# Patient Record
Sex: FEMALE | Race: WHITE | NOT HISPANIC OR LATINO
[De-identification: names, ages, dates, MRNs, and addresses within clinical notes are randomized per-mention and may not be internally consistent; named-entity substitution may affect disease eponyms.]

---

## 2020-07-15 PROBLEM — Z00.00 ENCOUNTER FOR PREVENTIVE HEALTH EXAMINATION: Status: ACTIVE | Noted: 2020-07-15

## 2020-07-28 ENCOUNTER — APPOINTMENT (OUTPATIENT)
Dept: GASTROENTEROLOGY | Facility: CLINIC | Age: 55
End: 2020-07-28
Payer: COMMERCIAL

## 2020-07-28 DIAGNOSIS — R19.7 DIARRHEA, UNSPECIFIED: ICD-10-CM

## 2020-07-28 PROCEDURE — 99442: CPT

## 2020-07-28 NOTE — ASSESSMENT
[FreeTextEntry1] : PATIENT HAS A HX OF BREAST CA..\par SHE IS UNDER THE CARE OF DR JACOBS\par \par HER MEDICAL HEALTH IS VERY GOOD\par \par LOOSE BM GENERALLY IN THE AM\par WITH URGENCY\par SO SHE IS INTERESTED IN TRYING BILE SALT BINDING TREATMENT\par \par AND WE WILL SET UP COLONOSCOPY\par REVIEW OF THE RISKS\par \par More than 50% of the face to face time was devoted to counseling and /or coordination of care.  THis coordination of care may have included reviewing other medical notes and reports, and communicating with other health professionals\par

## 2020-07-28 NOTE — HISTORY OF PRESENT ILLNESS
[FreeTextEntry1] : TELEPHONIC VISIT;  \par CONSENT OBTAINED\par SAW DR JACOBS\par AND HE SUGGESTED A COLONOSCOPY AS A FIVE YEAR FU\par \par AND SINCE IT IS SIX YEARS SHE \par \par DR FIOR SLOAN IS HER PCP\par \par AND FOR THIS REASON SHE HAS CONTACTED US\par \par NO GI SYMPTOMS\par \par HAS A HX OF CHOLECYSTECTOMY\par \par PATIENT HAS CHOLEREIC DIARRHEA..\par \par I HAD PRESCRIBED QUESTRAN, BUT SHE HAS AVOIDED TAKING IT..\par \par SHE WAS CONCERNED ABOUT GAINING WEIGHT\par \par HAS A BM ONCE OR SO PER DAY, BUT CAN BE WITH GREAT URGENCY\par \par IF SHE HAS A BM IN AM, SHE MAY BE OK FOR THE REMAINDER OF THE DAY\par \par MEDS; NONE\par \par NO CARDIOVASCULAR DISEASE, NO ANGINA\par NO SMOKING HX, NO ASTHM\par NO DIABETES

## 2020-09-01 ENCOUNTER — RESULT REVIEW (OUTPATIENT)
Age: 55
End: 2020-09-01

## 2020-09-03 ENCOUNTER — RESULT REVIEW (OUTPATIENT)
Age: 55
End: 2020-09-03

## 2020-09-04 ENCOUNTER — APPOINTMENT (OUTPATIENT)
Dept: GASTROENTEROLOGY | Facility: HOSPITAL | Age: 55
End: 2020-09-04

## 2020-12-01 ENCOUNTER — APPOINTMENT (OUTPATIENT)
Dept: GASTROENTEROLOGY | Facility: CLINIC | Age: 55
End: 2020-12-01
Payer: COMMERCIAL

## 2020-12-01 PROCEDURE — 99442: CPT

## 2020-12-01 NOTE — HISTORY OF PRESENT ILLNESS
[FreeTextEntry1] : 1.  we are doing a Telephonic visit, consent on file\par \par 2.  results of prior colonoscopy reviewed, first in six years, performed on sept 4th, 2020\par \par two sub cm, polyps were removed, both were tubular adenomas\par \par and in addition\par random colon biopsies because of diarrhea, and these were nl, no pathologc change\par \par 3.  patient had complained of long standing diarrhea, and i had been suspicious of post cholecystectomy diarrhea\par and for that reason, a trial of cholestyramine was instituted...\par \par actually, tablets of colestipol prescribed..\par one g bid\par \par immediate response and improvement;  \par \par most of the time she is fine, formed stool, first formed stools in years on the colestipid

## 2020-12-01 NOTE — ASSESSMENT
[FreeTextEntry1] : doing well...path reviewed\par \par doing well on colestipol one g po bid\par \par next visit suggested, telephonic in one year to check on hos she is doing with bile salt binding agent\par \par More than 50% of the face to face time was devoted to counseling and /or coordination of care.  THis coordination of care may have included reviewing other medical notes and reports, and communicating with other health professionals\par \par

## 2021-03-06 ENCOUNTER — RX RENEWAL (OUTPATIENT)
Age: 56
End: 2021-03-06

## 2021-09-24 ENCOUNTER — RESULT REVIEW (OUTPATIENT)
Age: 56
End: 2021-09-24

## 2021-09-26 ENCOUNTER — RX RENEWAL (OUTPATIENT)
Age: 56
End: 2021-09-26

## 2022-01-11 ENCOUNTER — APPOINTMENT (OUTPATIENT)
Dept: BREAST CENTER | Facility: CLINIC | Age: 57
End: 2022-01-11
Payer: COMMERCIAL

## 2022-01-11 ENCOUNTER — NON-APPOINTMENT (OUTPATIENT)
Age: 57
End: 2022-01-11

## 2022-01-11 VITALS
SYSTOLIC BLOOD PRESSURE: 168 MMHG | HEIGHT: 63 IN | DIASTOLIC BLOOD PRESSURE: 91 MMHG | HEART RATE: 85 BPM | BODY MASS INDEX: 28.35 KG/M2 | WEIGHT: 160 LBS

## 2022-01-11 DIAGNOSIS — Z82.5 FAMILY HISTORY OF ASTHMA AND OTHER CHRONIC LOWER RESPIRATORY DISEASES: ICD-10-CM

## 2022-01-11 DIAGNOSIS — Z92.21 PERSONAL HISTORY OF ANTINEOPLASTIC CHEMOTHERAPY: ICD-10-CM

## 2022-01-11 DIAGNOSIS — Z86.010 PERSONAL HISTORY OF COLONIC POLYPS: ICD-10-CM

## 2022-01-11 DIAGNOSIS — Z98.82 BREAST IMPLANT STATUS: ICD-10-CM

## 2022-01-11 DIAGNOSIS — Z80.7 FAMILY HISTORY OF OTHER MALIGNANT NEOPLASMS OF LYMPHOID, HEMATOPOIETIC AND RELATED TISSUES: ICD-10-CM

## 2022-01-11 DIAGNOSIS — Z23 ENCOUNTER FOR IMMUNIZATION: ICD-10-CM

## 2022-01-11 DIAGNOSIS — Z80.3 FAMILY HISTORY OF MALIGNANT NEOPLASM OF BREAST: ICD-10-CM

## 2022-01-11 DIAGNOSIS — N60.19 DIFFUSE CYSTIC MASTOPATHY OF UNSPECIFIED BREAST: ICD-10-CM

## 2022-01-11 DIAGNOSIS — Z12.31 ENCOUNTER FOR SCREENING MAMMOGRAM FOR MALIGNANT NEOPLASM OF BREAST: ICD-10-CM

## 2022-01-11 DIAGNOSIS — Z84.81 FAMILY HISTORY OF CARRIER OF GENETIC DISEASE: ICD-10-CM

## 2022-01-11 PROCEDURE — 99205 OFFICE O/P NEW HI 60 MIN: CPT

## 2022-01-11 NOTE — PHYSICAL EXAM
[Normocephalic] : normocephalic [Atraumatic] : atraumatic [Supple] : supple [No Supraclavicular Adenopathy] : no supraclavicular adenopathy [No Cervical Adenopathy] : no cervical adenopathy [No Thyromegaly] : no thyromegaly [Normal Sinus Rhythm] : normal sinus rhythm [Examined in the supine and seated position] : examined in the supine and seated position [No dominant masses] : no dominant masses in right breast  [No dominant masses] : no dominant masses left breast [No Nipple Retraction] : no left nipple retraction [No Nipple Discharge] : no left nipple discharge [No Axillary Lymphadenopathy] : no left axillary lymphadenopathy [No Edema] : no edema [No Rashes] : no rashes [No Ulceration] : no ulceration [de-identified] : S/P MRM/TRAM/Implt. NER. %. No lymphedema.

## 2022-01-11 NOTE — HISTORY OF PRESENT ILLNESS
[FreeTextEntry1] : Pt S/P R MRM/TRAM/Implt/ACT/tmx/AI for R Stage IIA ILCA/LCIS, ER+, ME+, Her2 1+\par S/P R Br Bx (9/19/03)([facility], [facility]): +2.5cm ILCA/LCIS (classic), ER+, ME+, Her2 1+\par S/P R MRM/TRAM ([facility])(9/29/03): +resid ILCA/LCIS, -0/23 LN, -margins\par R Stage IIA ILCA/LCIS\par S/P R Implt ([facility], [facility])\par S/P AC/T (?)([facility])\par Started tmx x 6mos > sw'ed to AI x 7 yrs\par S/P L Br Bx (2/4/06)([facility]): Benign\par BRCA ([facility])(11/29/21): BRCA-, +RAD50 Hetrozygous\par Colonoscopy (9/21): +polyps > 3yr F/U\par +FH Br Ca (P. FC 35 (BRCA+))\par Got Pfizer booster (11/21)([facility])\par No other Breast Surgery, Breast Procedures or Nipple Discharge. \par No FH Ovarian, Pancreatic Cancer or Melanoma. \par \par \par

## 2022-01-23 DIAGNOSIS — Z85.3 PERSONAL HISTORY OF MALIGNANT NEOPLASM OF BREAST: ICD-10-CM

## 2022-01-23 DIAGNOSIS — Z78.9 OTHER SPECIFIED HEALTH STATUS: ICD-10-CM

## 2022-01-24 DIAGNOSIS — F41.9 ANXIETY DISORDER, UNSPECIFIED: ICD-10-CM

## 2022-01-26 ENCOUNTER — TRANSCRIPTION ENCOUNTER (OUTPATIENT)
Age: 57
End: 2022-01-26

## 2022-01-26 ENCOUNTER — NON-APPOINTMENT (OUTPATIENT)
Age: 57
End: 2022-01-26

## 2022-01-26 ENCOUNTER — APPOINTMENT (OUTPATIENT)
Dept: CARDIOLOGY | Facility: CLINIC | Age: 57
End: 2022-01-26
Payer: COMMERCIAL

## 2022-01-26 VITALS
OXYGEN SATURATION: 97 % | DIASTOLIC BLOOD PRESSURE: 90 MMHG | SYSTOLIC BLOOD PRESSURE: 136 MMHG | BODY MASS INDEX: 28.7 KG/M2 | HEART RATE: 90 BPM | WEIGHT: 162 LBS

## 2022-01-26 DIAGNOSIS — R06.00 DYSPNEA, UNSPECIFIED: ICD-10-CM

## 2022-01-26 DIAGNOSIS — Z86.79 PERSONAL HISTORY OF OTHER DISEASES OF THE CIRCULATORY SYSTEM: ICD-10-CM

## 2022-01-26 DIAGNOSIS — Z86.39 PERSONAL HISTORY OF OTHER ENDOCRINE, NUTRITIONAL AND METABOLIC DISEASE: ICD-10-CM

## 2022-01-26 PROCEDURE — 93000 ELECTROCARDIOGRAM COMPLETE: CPT

## 2022-01-26 PROCEDURE — 99204 OFFICE O/P NEW MOD 45 MIN: CPT

## 2022-01-26 PROCEDURE — 36415 COLL VENOUS BLD VENIPUNCTURE: CPT

## 2022-01-28 ENCOUNTER — NON-APPOINTMENT (OUTPATIENT)
Age: 57
End: 2022-01-28

## 2022-01-28 PROBLEM — Z86.39 HISTORY OF VITAMIN D DEFICIENCY: Status: RESOLVED | Noted: 2022-01-28 | Resolved: 2022-01-28

## 2022-01-28 PROBLEM — Z86.79 HISTORY OF HYPERTENSION: Status: RESOLVED | Noted: 2022-01-26 | Resolved: 2022-01-28

## 2022-01-28 NOTE — HISTORY OF PRESENT ILLNESS
[FreeTextEntry1] : 56-year-old female\par Cardiology consultation requested because of hypertension\par \par Emy has no known heart disease. There is no history of a myocardial infarction angina congestive heart failure or cerebrovascular accident. Most recently she has been found to have elevated blood pressure levels typically 155- 150 / 90 -100 mmHg. There is no prior history of hypertension. Her brother has hypertension requiring medical therapy.\par \par Emy experiences dyspnea on exertion. However the symptoms are not progressive or severe. No chest pain. No syncope.\par \par There is no history of diabetes smoking or hyperlipidemia.  There is no family history of an acute myocardial infarction or sudden death\par \par \par Mrs. Garibay presents today for cardiovascular evaluation.

## 2022-01-28 NOTE — PHYSICAL EXAM
[Normal Conjunctiva] : normal conjunctiva [Normal S1, S2] : normal S1, S2 [Clear Lung Fields] : clear lung fields [Soft] : abdomen soft [Non Tender] : non-tender [Normal Bowel Sounds] : normal bowel sounds [Normal Gait] : normal gait [No Rash] : no rash [No Focal Deficits] : no focal deficits [Normal] : alert and oriented, normal memory [de-identified] : Appears well in no distress lying flat [de-identified] : normocephalic [de-identified] : no carotid bruit. No jugular venous distention [de-identified] : no murmur. No gallop. No diastolic sounds [de-identified] : full range of motion [de-identified] : no peripheral edema dorsalis pedis pulses +2 bilaterally. Feet warm and well-perfused. No ulcerations. [de-identified] : pleasant

## 2022-01-28 NOTE — REVIEW OF SYSTEMS
[Feeling Fatigued] : feeling fatigued [Joint Pain] : joint pain [Negative] : Heme/Lymph [FreeTextEntry2] : Appears in no distress lying flat [FreeTextEntry3] : glasses [FreeTextEntry5] : see history of present illness

## 2022-01-31 ENCOUNTER — APPOINTMENT (OUTPATIENT)
Dept: BREAST CENTER | Facility: CLINIC | Age: 57
End: 2022-01-31
Payer: COMMERCIAL

## 2022-01-31 VITALS — HEIGHT: 63 IN | BODY MASS INDEX: 28.35 KG/M2 | WEIGHT: 160 LBS

## 2022-01-31 DIAGNOSIS — R92.2 INCONCLUSIVE MAMMOGRAM: ICD-10-CM

## 2022-01-31 DIAGNOSIS — Z15.01 GENETIC SUSCEPTIBILITY TO MALIGNANT NEOPLASM OF BREAST: ICD-10-CM

## 2022-01-31 PROCEDURE — 99214 OFFICE O/P EST MOD 30 MIN: CPT

## 2022-02-01 PROBLEM — R92.2 DENSE BREAST TISSUE ON MAMMOGRAM: Status: ACTIVE | Noted: 2022-01-31

## 2022-02-01 PROBLEM — Z15.01 GENETIC SUSCEPTIBILITY TO MALIGNANT NEOPLASM OF BREAST: Status: ACTIVE | Noted: 2022-01-11

## 2022-02-01 NOTE — PHYSICAL EXAM
[No Supraclavicular Adenopathy] : no supraclavicular adenopathy [No Cervical Adenopathy] : no cervical adenopathy [Clear to Auscultation Bilat] : clear to auscultation bilaterally [Examined in the supine and seated position] : examined in the supine and seated position [Asymmetrical] : asymmetrical [No dominant masses] : no dominant masses in right breast  [No dominant masses] : no dominant masses left breast [No Nipple Retraction] : no left nipple retraction [No Nipple Discharge] : no left nipple discharge [No Axillary Lymphadenopathy] : no left axillary lymphadenopathy [Soft] : abdomen soft [Not Tender] : non-tender [No Hepato-Splenomegaly] : no hepato-splenomegaly [No Rashes] : no rashes [de-identified] : That is post right modified radical mastectomy with TRAM/implant, implant appears intact.

## 2022-02-01 NOTE — CONSULT LETTER
[Dear  ___] : Dear  [unfilled], [Consult Letter:] : I had the pleasure of evaluating your patient, [unfilled]. [Please see my note below.] : Please see my note below. [Consult Closing:] : Thank you very much for allowing me to participate in the care of this patient.  If you have any questions, please do not hesitate to contact me. [Sincerely,] : Sincerely, [FreeTextEntry3] : Jose Alberto Stover MD, FACS\par

## 2022-02-01 NOTE — HISTORY OF PRESENT ILLNESS
[FreeTextEntry1] : 56-year-old postmenopausal woman who in 2003 had a right modified radical mastectomy with TRAM flap reconstruction as well as implant for an invasive lobular carcinoma that was ER/SD positive HER-2 negative, stage IIa.  She had 0 of 23 lymph nodes and clear surgical margins.  She had follow-up ACT chemotherapy and took tamoxifen followed by an aromatase inhibitor.  At that time she wishes she had had a prophylactic mastectomy on the other side but was not offered this.  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  She was recommended to come see a breast surgeon for consideration of a prophylactic mastectomy by her gynecologist.  She went to see Remy Armijo who suggested that her risk was not high enough for prophylactic surgery.  She comes in to see me now for my opinion.  She had a left mammogram and ultrasound in March that was negative.  Patient currently denies any breast masses or nipple discharge.  Patient has a paternal cousin with breast cancer who is BRCA positive.

## 2022-02-07 RX ORDER — ALPRAZOLAM 1 MG/1
1 TABLET ORAL
Qty: 2 | Refills: 0 | Status: ACTIVE | COMMUNITY
Start: 2022-02-07 | End: 1900-01-01

## 2022-02-08 ENCOUNTER — APPOINTMENT (OUTPATIENT)
Dept: PLASTIC SURGERY | Facility: CLINIC | Age: 57
End: 2022-02-08
Payer: COMMERCIAL

## 2022-02-08 VITALS
BODY MASS INDEX: 28.35 KG/M2 | WEIGHT: 160 LBS | DIASTOLIC BLOOD PRESSURE: 88 MMHG | SYSTOLIC BLOOD PRESSURE: 134 MMHG | HEIGHT: 63 IN | RESPIRATION RATE: 16 BRPM

## 2022-02-08 PROCEDURE — 99204 OFFICE O/P NEW MOD 45 MIN: CPT

## 2022-02-09 ENCOUNTER — RESULT REVIEW (OUTPATIENT)
Age: 57
End: 2022-02-09

## 2022-02-15 ENCOUNTER — APPOINTMENT (OUTPATIENT)
Dept: CARDIOLOGY | Facility: CLINIC | Age: 57
End: 2022-02-15
Payer: COMMERCIAL

## 2022-02-15 PROCEDURE — 93306 TTE W/DOPPLER COMPLETE: CPT

## 2022-02-15 NOTE — HISTORY OF PRESENT ILLNESS
[FreeTextEntry1] : Patient is a 57 y/o Female referred by Dr. Stover presents for initial consultation for surgical reconstruction. She underwent Right modified radical mastectomy with TRAM pedicled flap reconstruction and second stage with TE to implant placement in October 2003 with Dr. Shamir Joseph ([facility]) for invasive lobular carcinoma that was ER/SD positive HER-2 negative, stage IIa. 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 Patient had previous Left breast reduction with mastopexy. She is having an MRI tomorrow. Today presents to discuss prophylactic mastectomy reconstructive options. \par \par PE: s/p Right Modified Radical mastectomy with TRAM Pedicled Flap /Implant reconstruction. \par Right breast implant contracted with contour deformities lateral aspect, reconstructed low/small right nipple with completely faded tattoo, well healed scar, Right breast smaller than Left, good natural upper pole fullness/cleavage, suture marks scar noted upper right lateral aspect of breast.\par Left breast: Well healed but visible areola scars s/p reduction and lift, base diameter 16-17 cm, B/L breasts asymmetrical.\par Abdomen: Donor site not viable as it has already been used for prior reconstruction, abdomen soft, NT, ND, no obvious masses. \par \par A/P:\par - Left breast mastectomy with TE and Surgimend Mesh\par - Right breast reconstruction of contour deformities with fat grafting from flanks to right breast\par - Second stage would require another nipple tattoo right breast.\par - Allergan Consent not provided at this time.  \par Nature of the surgery, risks, benefits, alternatives, expected postoperative course, recovery and long term results were reviewed. All questions answered. Patient motivated to proceed with surgery. Will coordinate for near future with Dr. Stover. \par \par \par \par

## 2022-02-17 ENCOUNTER — RX CHANGE (OUTPATIENT)
Age: 57
End: 2022-02-17

## 2022-02-24 ENCOUNTER — APPOINTMENT (OUTPATIENT)
Dept: CARDIOLOGY | Facility: CLINIC | Age: 57
End: 2022-02-24
Payer: COMMERCIAL

## 2022-02-24 PROCEDURE — 93015 CV STRESS TEST SUPVJ I&R: CPT

## 2022-03-18 ENCOUNTER — NON-APPOINTMENT (OUTPATIENT)
Age: 57
End: 2022-03-18

## 2022-03-19 ENCOUNTER — RESULT REVIEW (OUTPATIENT)
Age: 57
End: 2022-03-19

## 2022-03-21 ENCOUNTER — RESULT REVIEW (OUTPATIENT)
Age: 57
End: 2022-03-21

## 2022-03-22 ENCOUNTER — TRANSCRIPTION ENCOUNTER (OUTPATIENT)
Age: 57
End: 2022-03-22

## 2022-03-22 ENCOUNTER — APPOINTMENT (OUTPATIENT)
Dept: BREAST CENTER | Facility: HOSPITAL | Age: 57
End: 2022-03-22

## 2022-03-29 ENCOUNTER — APPOINTMENT (OUTPATIENT)
Dept: BREAST CENTER | Facility: CLINIC | Age: 57
End: 2022-03-29
Payer: COMMERCIAL

## 2022-03-29 VITALS
DIASTOLIC BLOOD PRESSURE: 82 MMHG | OXYGEN SATURATION: 98 % | SYSTOLIC BLOOD PRESSURE: 117 MMHG | BODY MASS INDEX: 28.34 KG/M2 | HEART RATE: 76 BPM | WEIGHT: 160 LBS

## 2022-03-29 DIAGNOSIS — Z90.12 ACQUIRED ABSENCE OF LEFT BREAST AND NIPPLE: ICD-10-CM

## 2022-03-29 DIAGNOSIS — Z90.11 ACQUIRED ABSENCE OF RIGHT BREAST AND NIPPLE: ICD-10-CM

## 2022-03-29 PROCEDURE — 99024 POSTOP FOLLOW-UP VISIT: CPT

## 2022-03-29 NOTE — HISTORY OF PRESENT ILLNESS
[FreeTextEntry1] : 3/22 left nipple sparing total mastectomy\par Pathology results are pending although the pathologist told me that she has seen a 2 mm focus of ER/WA positive HER-2 negative invasive lobular carcinoma with negative margins.\par \par Patient is doing well after surgery, pain under control.  She is worried about bruising and swelling of her abdomen after fat grafting.\par \par \par 56-year-old postmenopausal woman who in 2003 had a right modified radical mastectomy with TRAM flap reconstruction as well as implant for an invasive lobular carcinoma that was ER/WA positive HER-2 negative, stage IIa.  She had 0 of 23 lymph nodes and clear surgical margins.  She had follow-up ACT chemotherapy and took tamoxifen followed by an aromatase inhibitor.  At that time she wishes she had had a prophylactic mastectomy on the other side but was not offered this.  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  She was recommended to come see a breast surgeon for consideration of a prophylactic mastectomy by her gynecologist.  She went to see Remy Armijo who suggested that her risk was not high enough for prophylactic surgery.  She comes in to see me now for my opinion.  She had a left mammogram and ultrasound in March that was negative.  Patient currently denies any breast masses or nipple discharge.  Patient has a paternal cousin with breast cancer who is BRCA positive.

## 2022-03-30 ENCOUNTER — NON-APPOINTMENT (OUTPATIENT)
Age: 57
End: 2022-03-30

## 2022-04-05 ENCOUNTER — APPOINTMENT (OUTPATIENT)
Dept: PLASTIC SURGERY | Facility: CLINIC | Age: 57
End: 2022-04-05
Payer: COMMERCIAL

## 2022-04-05 PROCEDURE — 99024 POSTOP FOLLOW-UP VISIT: CPT

## 2022-04-06 NOTE — HISTORY OF PRESENT ILLNESS
[FreeTextEntry1] : Patient is a 57 y/o F 2.5 weeks s/p Left Mastectomy (Dr. Stover) with TE placement and Right breast reconstruction of contour deformities with fat grafting from flank to right breast on 3/22. Left MARIANELA drain in place with OP 10cc.  \par \par PE: Left breast healing well no infections, no collection.\par \par Follow up next week to begin expander fills.\par TE is ref# [id number] with 700 cc capacity, prefilled with 400 cc Air

## 2022-04-12 ENCOUNTER — APPOINTMENT (OUTPATIENT)
Dept: PLASTIC SURGERY | Facility: CLINIC | Age: 57
End: 2022-04-12
Payer: COMMERCIAL

## 2022-04-12 PROCEDURE — 99024 POSTOP FOLLOW-UP VISIT: CPT

## 2022-04-12 NOTE — HISTORY OF PRESENT ILLNESS
[FreeTextEntry1] : Patient is a 55 y/o F 3 weeks s/p Left Mastectomy (Dr. Stover) with TE placement and Right breast reconstruction of contour deformities with fat grafting from flank to right breast on 3/22. Pathology 2mm ER/OK positive HER-2 negative invasive lobular carcinoma. Presents for 1st TE fills. Patient is deciding if she will be having left lymph nodes dissected. \par \par PE: Left breast healing well no infections, no collection, incisions healing well. \par Abdomen: soft, NT, ND, ecchymosis resolving central abdomen, no collections/infection, umbilicus viable.\par \par TE is ref# [id number] with 700 cc capacity, prefilled with 400 cc Air\par \par Procedure: Tissue Expansion \par After localizing the port of the tissue expander with a magnet I prepped the area with Chloraprep. Then using a 21-gauge butterfly needle and a 60 cc syringe under sterile conditions I injected  540 cc of fluid into the Left tissue expander.\par \par New Volumes are: \par Left =  540  cc saline\par \par A/P:\par - Patient tolerated the procedure well without any complications. \par - Follow-up in 2 weeks for possible additional TE expansion.\par \par

## 2022-04-14 RX ORDER — DIAZEPAM 5 MG/1
5 TABLET ORAL EVERY 8 HOURS
Qty: 9 | Refills: 0 | Status: ACTIVE | COMMUNITY
Start: 2022-03-18 | End: 1900-01-01

## 2022-04-19 ENCOUNTER — APPOINTMENT (OUTPATIENT)
Dept: PLASTIC SURGERY | Facility: CLINIC | Age: 57
End: 2022-04-19
Payer: COMMERCIAL

## 2022-04-19 PROCEDURE — 99024 POSTOP FOLLOW-UP VISIT: CPT

## 2022-04-25 NOTE — HISTORY OF PRESENT ILLNESS
[FreeTextEntry1] : Patient is a 55 y/o F 3 weeks s/p Left Mastectomy (Dr. Stover) with TE placement and Right breast reconstruction of contour deformities with fat grafting from flank to right breast on 3/22. Pathology 2mm ER/NV positive HER-2 negative invasive lobular carcinoma. Presents for 1st TE fills. Patient is deciding if she will be having left lymph nodes dissected. \par \par PE: Left breast healing well no infections, no collection, incisions healing well. \par Abdomen: soft, NT, ND, ecchymosis resolving central abdomen, no collections/infection, umbilicus viable.\par \par TE is ref# [id number] with 700 cc capacity,\par \par Current Volume: \par Left =  540  cc saline\par \par Follow up in 2 weeks to plan TE to implant exchange\par \par

## 2022-04-26 ENCOUNTER — APPOINTMENT (OUTPATIENT)
Dept: PLASTIC SURGERY | Facility: CLINIC | Age: 57
End: 2022-04-26
Payer: COMMERCIAL

## 2022-04-26 PROCEDURE — 99024 POSTOP FOLLOW-UP VISIT: CPT

## 2022-05-03 ENCOUNTER — APPOINTMENT (OUTPATIENT)
Dept: PLASTIC SURGERY | Facility: CLINIC | Age: 57
End: 2022-05-03

## 2022-05-04 ENCOUNTER — NON-APPOINTMENT (OUTPATIENT)
Age: 57
End: 2022-05-04

## 2022-05-06 NOTE — HISTORY OF PRESENT ILLNESS
[FreeTextEntry1] : Patient is a 57 y/o F 1 month s/p Left Mastectomy (Dr. Stover) with TE placement and Right breast reconstruction of contour deformities with fat grafting from flank to right breast on 3/22. Pathology 2mm ER/RI positive HER-2 negative invasive lobular carcinoma. Patient is deciding if she will be having left lymph nodes dissected, and has a consultation with [facility] tomorrow for second opinion. Today states that she feels like her Left TE has subsided. She had felt it was heavy and had some pain over the weekend which has resolved. Now shape has changed and just feels heavy. \par \par PE: Left breast incision healing well no infections, no collection, incisions healing well. Upper suture from TE has detached and TE is sitting low and lateral. No signs of TE leaking, only displacement. \par Abdomen: soft, NT, ND, ecchymosis resolving central abdomen, no collections/infection, umbilicus viable.\par \par TE is ref# [id number] with 700 cc capacity, prefilled with 400 cc Air\par \par Volumes are: \par Left =  540  cc saline\par \par A/P:\par - Patient would like to proceed with exchange from TE to Silicone Implant as soon as possible. Initially couldn't do May, but able to do sooner and at either [facility] or [facility]. \par - Will begin to plan for exchange.\par \par \par

## 2022-05-06 NOTE — SURGICAL HISTORY
[de-identified] : 03/22/22; Left Mastectomy (Dr. Stover) with TE placement and Right breast reconstruction of contour deformities with fat grafting from flank to right breast

## 2022-05-13 ENCOUNTER — NON-APPOINTMENT (OUTPATIENT)
Age: 57
End: 2022-05-13

## 2022-05-13 RX ORDER — DIAZEPAM 5 MG/1
5 TABLET ORAL EVERY 8 HOURS
Qty: 9 | Refills: 0 | Status: ACTIVE | COMMUNITY
Start: 2022-05-13 | End: 1900-01-01

## 2022-05-18 ENCOUNTER — APPOINTMENT (OUTPATIENT)
Dept: BREAST CENTER | Facility: CLINIC | Age: 57
End: 2022-05-18

## 2022-05-24 ENCOUNTER — APPOINTMENT (OUTPATIENT)
Dept: PLASTIC SURGERY | Facility: CLINIC | Age: 57
End: 2022-05-24
Payer: COMMERCIAL

## 2022-05-24 PROCEDURE — 99024 POSTOP FOLLOW-UP VISIT: CPT

## 2022-06-02 NOTE — HISTORY OF PRESENT ILLNESS
[FreeTextEntry1] : Patient is a 57 y/o F s/p Removal of TE and placement of Left silicone implant and Left sentinel node biopsy on 5/18 at [facility]. \par \par Left breast: Left Implant intact \par \par A/P:\par - Surgical bra\par - Activity & limitations reviewed \par \par \par \par

## 2022-06-02 NOTE — SURGICAL HISTORY
[de-identified] : 03/22/22; Left Mastectomy (Dr. Stover) with TE placement and Right breast reconstruction of contour deformities with fat grafting from flank to right breast

## 2022-06-07 ENCOUNTER — APPOINTMENT (OUTPATIENT)
Dept: PLASTIC SURGERY | Facility: CLINIC | Age: 57
End: 2022-06-07
Payer: COMMERCIAL

## 2022-06-07 PROCEDURE — 99024 POSTOP FOLLOW-UP VISIT: CPT

## 2022-06-08 NOTE — HISTORY OF PRESENT ILLNESS
[FreeTextEntry1] : Patient is a 57 y/o F s/p Removal of TE and placement of Left Silicone implant and Left sentinel node biopsy on 5/18 at [facility]. Patient states that she feel like her left breast hangs to the side when she lays flat. She is happy with the appearance standing up. \par \par Left breast: Left Implant intact with + mild seroma, incisions healing well, mild erythema medial breast, no signs of infection or hematoma. Implant shifts laterally within the pocket when patient is lying flat and leaves mild indentation midline. When standing up breasts lay even and have natural appearance and good proportion to right reconstructed breast. \par \par A/P:\par - Monitor Left breast seroma.\par - Surgical bra\par - Activity & limitations reviewed \par - Bactrim x 1 week as prophylaxis for left breast mild irritation \par - F/U in 6 weeks \par - Discussed possible revision of left breast after all healing has been completed \par

## 2022-06-08 NOTE — SURGICAL HISTORY
[de-identified] : 03/22/22; Left Mastectomy (Dr. Stover) with TE placement and Right breast reconstruction of contour deformities with fat grafting from flank to right breast

## 2022-07-12 ENCOUNTER — APPOINTMENT (OUTPATIENT)
Dept: BREAST CENTER | Facility: CLINIC | Age: 57
End: 2022-07-12

## 2022-07-26 ENCOUNTER — APPOINTMENT (OUTPATIENT)
Dept: PLASTIC SURGERY | Facility: CLINIC | Age: 57
End: 2022-07-26

## 2022-07-26 PROCEDURE — 99212 OFFICE O/P EST SF 10 MIN: CPT | Mod: 24

## 2022-07-26 NOTE — SURGICAL HISTORY
[de-identified] : 03/22/22; Left Mastectomy (Dr. Stover) with TE placement and Right breast reconstruction of contour deformities with fat grafting from flank to right breast

## 2022-07-26 NOTE — HISTORY OF PRESENT ILLNESS
[FreeTextEntry1] : Patient is a 55 y/o F s/p Removal of TE and placement of Left Silicone implant and Left sentinel node biopsy on 5/18 at [facility]. Patient states that her left breast hangs to the side when she lays flat and causes an indentation medially. She is happy with the appearance standing up, but would like to proceed with a revision and smaller implant. She feels better overall. \par \par Left breast: Left Implant intact, seroma resolved, incisions healing well, no signs of infection or hematoma. Implant shifts laterally within the pocket when patient is lying flat and leaves mild indentation midline. When standing up breasts lay even and have natural appearance and good proportion to right reconstructed breast. \par \par A/P: Seroma resolved, possible revision in future. \par - Discussed possible revision of left breast after all healing has been completed. Advised that size of implant is due to the tissue she has to fill the pocket. She would require lateral aspect tacking to have the breast lay more medial when being flat. \par - Patient prefers to wait another 6 months before having additional surgery\par Will reassess in 5 months.\par

## 2022-09-13 ENCOUNTER — RX RENEWAL (OUTPATIENT)
Age: 57
End: 2022-09-13

## 2022-09-13 RX ORDER — COLESTIPOL HYDROCHLORIDE 1 G/1
1 TABLET, FILM COATED ORAL TWICE DAILY
Qty: 60 | Refills: 5 | Status: ACTIVE | COMMUNITY
Start: 2020-07-28 | End: 1900-01-01

## 2022-12-27 ENCOUNTER — APPOINTMENT (OUTPATIENT)
Dept: PLASTIC SURGERY | Facility: CLINIC | Age: 57
End: 2022-12-27

## 2023-02-19 ENCOUNTER — RX RENEWAL (OUTPATIENT)
Age: 58
End: 2023-02-19

## 2023-02-24 ENCOUNTER — NON-APPOINTMENT (OUTPATIENT)
Age: 58
End: 2023-02-24

## 2023-09-13 ENCOUNTER — APPOINTMENT (OUTPATIENT)
Dept: GASTROENTEROLOGY | Facility: CLINIC | Age: 58
End: 2023-09-13
Payer: COMMERCIAL

## 2023-09-13 DIAGNOSIS — D36.9 BENIGN NEOPLASM, UNSPECIFIED SITE: ICD-10-CM

## 2023-09-13 DIAGNOSIS — C50.911 MALIGNANT NEOPLASM OF UNSPECIFIED SITE OF RIGHT FEMALE BREAST: ICD-10-CM

## 2023-09-13 DIAGNOSIS — Z90.49 ACQUIRED ABSENCE OF OTHER SPECIFIED PARTS OF DIGESTIVE TRACT: ICD-10-CM

## 2023-09-13 PROCEDURE — 99213 OFFICE O/P EST LOW 20 MIN: CPT | Mod: 95

## 2023-09-13 RX ORDER — SODIUM PICOSULFATE, MAGNESIUM OXIDE, AND ANHYDROUS CITRIC ACID 12; 3.5; 1 G/175ML; G/175ML; MG/175ML
10-3.5-12 MG-GM LIQUID ORAL
Qty: 2 | Refills: 1 | Status: ACTIVE | COMMUNITY
Start: 2023-09-13 | End: 1900-01-01

## 2023-09-18 ENCOUNTER — TRANSCRIPTION ENCOUNTER (OUTPATIENT)
Age: 58
End: 2023-09-18

## 2023-09-18 ENCOUNTER — APPOINTMENT (OUTPATIENT)
Dept: GASTROENTEROLOGY | Facility: HOSPITAL | Age: 58
End: 2023-09-18

## 2023-11-27 ENCOUNTER — NON-APPOINTMENT (OUTPATIENT)
Age: 58
End: 2023-11-27

## 2023-11-27 ENCOUNTER — APPOINTMENT (OUTPATIENT)
Dept: CARDIOLOGY | Facility: CLINIC | Age: 58
End: 2023-11-27
Payer: COMMERCIAL

## 2023-11-27 VITALS
OXYGEN SATURATION: 99 % | DIASTOLIC BLOOD PRESSURE: 94 MMHG | HEART RATE: 114 BPM | BODY MASS INDEX: 28.34 KG/M2 | WEIGHT: 160 LBS | SYSTOLIC BLOOD PRESSURE: 168 MMHG

## 2023-11-27 DIAGNOSIS — I10 ESSENTIAL (PRIMARY) HYPERTENSION: ICD-10-CM

## 2023-11-27 DIAGNOSIS — E66.3 OVERWEIGHT: ICD-10-CM

## 2023-11-27 PROCEDURE — 99214 OFFICE O/P EST MOD 30 MIN: CPT | Mod: 25

## 2023-11-27 PROCEDURE — 93000 ELECTROCARDIOGRAM COMPLETE: CPT

## 2023-11-27 RX ORDER — LOSARTAN POTASSIUM 25 MG/1
25 TABLET, FILM COATED ORAL
Qty: 180 | Refills: 3 | Status: ACTIVE | COMMUNITY
Start: 2023-11-27 | End: 1900-01-01

## 2023-12-06 PROBLEM — E66.3 OVERWEIGHT: Status: ACTIVE | Noted: 2023-12-06

## 2023-12-06 PROBLEM — I10 HYPERTENSION: Status: ACTIVE | Noted: 2022-01-28

## 2023-12-06 RX ORDER — VENLAFAXINE 37.5 MG/1
TABLET ORAL
Refills: 0 | Status: ACTIVE | COMMUNITY

## 2023-12-06 RX ORDER — TAMOXIFEN CITRATE 20 MG/1
TABLET, FILM COATED ORAL
Refills: 0 | Status: ACTIVE | COMMUNITY

## 2024-01-09 ENCOUNTER — APPOINTMENT (OUTPATIENT)
Dept: CARDIOLOGY | Facility: CLINIC | Age: 59
End: 2024-01-09
Payer: COMMERCIAL

## 2024-01-09 PROCEDURE — 99442: CPT

## 2024-02-05 RX ORDER — AMLODIPINE BESYLATE 2.5 MG/1
2.5 TABLET ORAL
Qty: 90 | Refills: 2 | Status: ACTIVE | COMMUNITY
Start: 2024-01-09

## 2024-02-22 ENCOUNTER — NON-APPOINTMENT (OUTPATIENT)
Age: 59
End: 2024-02-22

## 2024-04-04 ENCOUNTER — APPOINTMENT (OUTPATIENT)
Dept: CARDIOLOGY | Facility: CLINIC | Age: 59
End: 2024-04-04

## 2024-05-21 ENCOUNTER — RX RENEWAL (OUTPATIENT)
Age: 59
End: 2024-05-21

## 2024-05-21 RX ORDER — LOSARTAN POTASSIUM 25 MG/1
25 TABLET, FILM COATED ORAL
Qty: 90 | Refills: 3 | Status: ACTIVE | COMMUNITY
Start: 2023-02-24 | End: 1900-01-01

## 2024-05-29 ENCOUNTER — APPOINTMENT (OUTPATIENT)
Dept: CARDIOLOGY | Facility: CLINIC | Age: 59
End: 2024-05-29
Payer: COMMERCIAL

## 2024-05-29 DIAGNOSIS — R06.02 SHORTNESS OF BREATH: ICD-10-CM

## 2024-05-29 PROCEDURE — 93306 TTE W/DOPPLER COMPLETE: CPT

## 2024-05-30 PROBLEM — R06.02 SHORTNESS OF BREATH: Status: ACTIVE | Noted: 2022-01-28

## 2024-05-31 ENCOUNTER — NON-APPOINTMENT (OUTPATIENT)
Age: 59
End: 2024-05-31

## 2024-05-31 NOTE — REVIEW OF SYSTEMS
[FreeTextEntry2] : Appears in no distress lying flat [FreeTextEntry3] : glasses [FreeTextEntry5] : see history of present illness

## 2024-05-31 NOTE — HISTORY OF PRESENT ILLNESS
[FreeTextEntry1] : 58-year-old female Unanticipated visit because of hypertension dyspnea and overweight.  Emy reports elevated blood pressure levels while taking Indocin 75 mg in varying doses for treatment of migraine headache.  Blood pressure levels typically 160 mmHg. systolic  despite the present treatment with losartan 25 mg/day.  She experiences occasional palpitations.  No syncope. Dyspnea on  exertion is not progressive, severe or different from the past.

## 2024-05-31 NOTE — PHYSICAL EXAM
[de-identified] : Appears well in no distress lying flat [de-identified] : normocephalic [de-identified] : no carotid bruit. No jugular venous distention [de-identified] : full range of motion [de-identified] : no murmur. No gallop. No diastolic sounds [de-identified] : no peripheral edema dorsalis pedis pulses +2 bilaterally. Feet warm and well-perfused. No ulcerations. [de-identified] : pleasant

## 2024-05-31 NOTE — DISCUSSION/SUMMARY
[FreeTextEntry1] : Hypertension The working diagnosis is essential hypertension. The most recent guidelines provided by the American College of cardiology and American Heart Association have lowered the threshold for initiation or intensification of medical intervention for  the. treatment of hypertension. Prior treatment with lisinopril was not tolerated. Non-pharmacological therapy, specifically diet exercise and weight loss arer emphasized . as  major aspects of treatment.   I have recommended the following a. Low salt low caloric diet. Regular aerobic exercise.  Avoidance of non-steroidal anti-inflammatory agents b.  increase Losartan dose from 25 mg/day  to. 25 mg bid. Further dose adjustment dictated by tolerance and response c. target blood pressure less than 140/90 mmHg d home blood pressure monitoring   Shortness of breath The working diagnoses dyspnea on exertion secondary to de-conditioning and mild obesity. The history is consistent with this diagnosis. The absence of chest pain  , 11/23 electrocardiogram showing no evidence of myocardial ischemia/infarction  and normal 2/22 exercise treadmill, ECG study. argue against  myocardial ischemia/atherosclerotic heart disease. . A 2/22 echocardiogram revealed normal valvular function and left. ventricular  ejection fraction of 67%    I have recommended the following a. Low salt low chloride diet. Regular aerobic exercise and weight loss b. No further cardiac testing for this problem.   Overweight. Being overweight, exacerbates Emy 's cardiovascular issues. Today Rachael Garibay is. 5' 3" tall and weighs 160 pounds. Diet exercise and weight loss are advised.    The diagnosis, prognosis, risks, options alternatives were explained at length to the patient pre-all questions were answered. Issues discussed included hypertension antihypertensive medical therapy home blood pressure monitoring shortness of breath noninvasive cardiac testing diet and exercise.  Counseling and/or coordination of care. Time was aa significant. factor. for. this. patent encounter. Total time   spent. with the  patient was 30 minutes. Greater than 50% of the time was devoted  to. counseling and /or coordination of care.

## 2025-01-29 ENCOUNTER — RESULT REVIEW (OUTPATIENT)
Age: 60
End: 2025-01-29

## 2025-01-29 ENCOUNTER — TRANSCRIPTION ENCOUNTER (OUTPATIENT)
Age: 60
End: 2025-01-29

## 2025-08-04 ENCOUNTER — APPOINTMENT (OUTPATIENT)
Age: 60
End: 2025-08-04
Payer: COMMERCIAL

## 2025-08-04 VITALS
HEIGHT: 62.99 IN | BODY MASS INDEX: 27.82 KG/M2 | OXYGEN SATURATION: 97 % | HEART RATE: 89 BPM | WEIGHT: 157 LBS | DIASTOLIC BLOOD PRESSURE: 74 MMHG | SYSTOLIC BLOOD PRESSURE: 142 MMHG

## 2025-08-04 DIAGNOSIS — E66.3 OVERWEIGHT: ICD-10-CM

## 2025-08-04 DIAGNOSIS — Z90.12 ACQUIRED ABSENCE OF LEFT BREAST AND NIPPLE: ICD-10-CM

## 2025-08-04 DIAGNOSIS — I48.91 UNSPECIFIED ATRIAL FIBRILLATION: ICD-10-CM

## 2025-08-04 DIAGNOSIS — C50.919 MALIGNANT NEOPLASM OF UNSPECIFIED SITE OF UNSPECIFIED FEMALE BREAST: ICD-10-CM

## 2025-08-04 DIAGNOSIS — Z85.3 PERSONAL HISTORY OF MALIGNANT NEOPLASM OF BREAST: ICD-10-CM

## 2025-08-04 DIAGNOSIS — Z92.89 PERSONAL HISTORY OF OTHER MEDICAL TREATMENT: ICD-10-CM

## 2025-08-04 DIAGNOSIS — R92.30 DENSE BREASTS, UNSPECIFIED: ICD-10-CM

## 2025-08-04 DIAGNOSIS — Z87.898 PERSONAL HISTORY OF OTHER SPECIFIED CONDITIONS: ICD-10-CM

## 2025-08-04 DIAGNOSIS — R06.09 OTHER FORMS OF DYSPNEA: ICD-10-CM

## 2025-08-04 PROCEDURE — 99205 OFFICE O/P NEW HI 60 MIN: CPT

## 2025-08-04 RX ORDER — APIXABAN 5 MG/1
5 TABLET, FILM COATED ORAL
Refills: 0 | Status: ACTIVE | COMMUNITY
Start: 2025-08-04

## 2025-08-04 RX ORDER — METOPROLOL SUCCINATE 25 MG/1
25 TABLET, EXTENDED RELEASE ORAL
Refills: 0 | Status: ACTIVE | COMMUNITY
Start: 2025-08-04

## 2025-08-04 RX ORDER — TIRZEPATIDE 7.5 MG/.5ML
7.5 INJECTION, SOLUTION SUBCUTANEOUS
Qty: 4 | Refills: 3 | Status: ACTIVE | COMMUNITY
Start: 2025-08-04 | End: 1900-01-01

## 2025-09-04 ENCOUNTER — APPOINTMENT (OUTPATIENT)
Age: 60
End: 2025-09-04
Payer: COMMERCIAL

## 2025-09-04 VITALS — WEIGHT: 152.8 LBS | BODY MASS INDEX: 27.08 KG/M2

## 2025-09-04 DIAGNOSIS — E66.3 OVERWEIGHT: ICD-10-CM

## 2025-09-04 PROCEDURE — 99213 OFFICE O/P EST LOW 20 MIN: CPT | Mod: 95
